# Patient Record
Sex: FEMALE | Race: WHITE | Employment: FULL TIME | URBAN - METROPOLITAN AREA
[De-identification: names, ages, dates, MRNs, and addresses within clinical notes are randomized per-mention and may not be internally consistent; named-entity substitution may affect disease eponyms.]

---

## 2018-10-04 ENCOUNTER — HOSPITAL ENCOUNTER (OUTPATIENT)
Dept: PHYSICAL THERAPY | Age: 43
End: 2018-10-04

## 2018-10-18 ENCOUNTER — HOSPITAL ENCOUNTER (OUTPATIENT)
Dept: PHYSICAL THERAPY | Age: 43
Discharge: HOME | End: 2018-10-18
Payer: COMMERCIAL

## 2018-10-18 PROCEDURE — 97161 PT EVAL LOW COMPLEX 20 MIN: CPT

## 2018-10-18 NOTE — PROGRESS NOTES
(GP):DASIA [address] [facility] PHYSICAL THERAPY 
[address] [facility] [address], [place], [address] - Phone: [phone]  Fax: [phone] PLAN OF CARE / STATEMENT OF MEDICAL NECESSITY FOR PHYSICAL THERAPY SERVICES Patient Name: Brittany Kim : 1975 Medical  
Diagnosis: Gait instability [R26.81] Vertigo [R42] Treatment Diagnosis: Vertigo, BPPV Onset Date: ~ 6 weeks ago Referral Source: Brittany Beltre MD [facility]): 10/18/2018 Prior Hospitalization: See medical history Provider #: [id number] Prior Level of Function: independent Comorbidities: Previous episode of vertigo in 2017 which resolved after Epley maneuver Medications: Verified on Patient Summary List  
The Plan of Care and following information is based on the information from the initial evaluation.  
=========================================================================================== Assessment / key information:  Patient is 37y.o. year old female who presents to [facility] in [place] with diagnosis of Gait instability [R26.81] Vertigo [R42]. Patient reports she rolled over in bed in 2017 and experienced vertigo. States she researched her symptoms online and performed the Epley maneuver which resolved the symptoms. States vertigo returned ~ 6 weeks ago when she rolled onto her right side. States she immediately performed the Epley maneuver. States the vertigo has resolved but states she \"does not feel right. \"  She describes a constant drunken feeling. She has also performed the sommersault technique for BPPV with no significant help.  Objective findings: 1) static standing balance WNL with exception of stance on rail with eyes closed (2\" on first attempt), 2) Sensory Organization Test (SOT) composite score of 78 (WNL), 3) Head Shake SOT shows a decline of 40% in balance control with vertical head movements (normal < 30%) 4) score of 46/100 on the Dizziness Handicap Inventory [facility]), placing her in the moderate handicap group 5) score of 29/30 on the Functional Gait Assessment (WNL). Patient with a Functional Status score of 93/100 on FOTO (Focused on Therapeutic Outcomes), which corresponds to a functional limitation of 7%. Patient can benefit from PT interventions to decrease fall risk and  improve safety with ADLs. . 
=========================================================================================== Eval Complexity: History: LOW Complexity : Zero comorbidities / personal factors that will impact the outcome / POCExam:MEDIUM Complexity : 3 Standardized tests and measures addressing body structure, function, activity limitation and / or participation in recreation  Presentation: MEDIUM Complexity : Evolving with changing characteristics  Clinical Decision Making:LOW Complexity : FOTO score of 75-100Overall Complexity:LOW Problem List: impaired gait/ balance, decrease ADL/ functional abilitiies, decrease activity tolerance, decrease flexibility/ joint mobility, decrease transfer abilities and other dizziness affecting function Treatment Plan may include any combination of the following: Therapeutic exercise, Therapeutic activities, Neuromuscular re-education, Physical agent/modality, Gait/balance training, Manual therapy and Patient education Patient / Family readiness to learn indicated by: asking questions, trying to perform skills and interest 
Persons(s) to be included in education: patient (P) Barriers to Learning/Limitations: None Measures taken:   
Patient Goal (s): \"I hope the problem goes away. \"  
Patient self reported health status: excellent Rehabilitation Potential: good ? Short Term Goals: To be accomplished in  4  weeks: 
1) Patient will report a 40% decrease in symptoms to improve quality of life. 2) Patient will be compliant with home exercise program. 
? Long Term Goals: To be accomplished in  8  weeks: 1) Patient will score < 28/100 on the [address] [place] [address] Street to indicate increased tolerance with functional activities. 2) Patient will report an 80% improvement in symptoms to improve quality of life. 3) Increase  Head Shake SOT average balance control to 0.70 or 30% decline with vertical head movements. 4) Patient to be independent with HEP in preparation for D/C. 
5 Frequency / Duration:   Patient to be seen  1  times per week for 6-8  weeks: 
Patient / Caregiver education and instruction: self care G-Codes (GP): NIC Therapist Signature: Jeffry Marx, PT Date: 10/18/2018 Certification Period: NA Time: 3:01 PM  
=============================================================== I certify that the above Physical Therapy Services are being furnished while the patient is under my care. I agree with the treatment plan and certify that this therapy is necessary. Physician Signature:       Date:      Time:  Please sign and return to [facility] or you may fax the signed copy to [phone].   Thank you. 
 
 
===========

## 2018-10-18 NOTE — PROGRESS NOTES
PHYSICAL THERAPY - DAILY TREATMENT NOTE Patient Name: Elizabeth Medeiros        Date: 10/18/2018 : 1975   YES Patient  Verified Visit #:   1   of   8  Insurance: Payor: Claude Nims / Plan: Donavan Tacojose luis PPO / Product Type: PPO / In time: 1:30 Out time: 2:50 Total Treatment Time: 80 Medicare Time Tracking (below) Total Timed Codes (min):  NA 1:1 Treatment Time:  NA  
TREATMENT AREA =  Vertigo SUBJECTIVE Pain Level (on 0 to 10 scale):  0  / 10 Medication Changes/New allergies or changes in medical history, any new surgeries or procedures? NO    If yes, update Summary List  
Subjective Functional Status/Changes:  []  No changes reported States she turned over in bed in 2017 and she self diagnosed herself and did the Epley maneuver. States she had another episode of vertigo ~6 weeks ago when she rolled onto her right side. .  States she did the summersault maneuver which helped to decrease vertigo. However, she states she has not felt \"right\" since this incident. States she has continued to perform Epley and sommersualt maneuvers with no success. States she feels \"drunk\" all the time. States she feels that she lists to the right. She reports increased symptoms with looking down. She walks 1 hour/day. She denies any vertigo since intial Epley maneuver. OBJECTIVE Physical Therapy Evaluation - Vestibular Posture:  [x] WNL [] Forward head    [] Protracted shoulders    [] Retracted shoulders 
[] Kyphosis:  [] increased   [] decreased  
[] Lordosis:   [] increased   [] decreased Other: C/S ROM: [x] WFL    [] Limited    Describe: 
 
Strength: [x] WFL    [] Limited    Describe: 
 
Optional Tests: 
Sensation:  [] Intact [] Diminished    Describe: 
 
Proprioception: [] Intact [] Diminished    Describe: 
 
Coordination Testing: 
     Disdiadochokinesia [] WFL    [] Impaired    Describe: 
     Heel - Mantilla  [] [facility]    [] Impaired    Describe: FNF   [] [facility]    [] Impaired    Describe: Toe Tap   [] WFL    [] Impaired    Describe: 
 
Oculomotor Tests: (Fixation Not Blocked) Ocular ROM:   [x] [facility]    [] Limited    Describe: 
     Spontaneous Nystag. [x] Neg     [] Pos    [] Left    [] Right Gaze Holding Nystag. [x] Neg     [] Pos    [] Left    [] Right Smooth Pursuit  [x] Neg     [] Pos    [] Left    [] Right Saccades   [x] Neg     [] Pos    [] Left    [] Right VOR - Slow Head Mvmt [x] Neg     [] Pos    [] Left    [] Right VOR - Fast Head Mvmt [x] Neg     [] Pos    [] Left    [] Right Head Thrust  [x] Neg     [] Pos    [] Left    [] Right Static Visual Acuity [] Neg     [] Pos    [] Left    [] Right Dynamic Visual Acuity [] Neg     [] Pos    [] Left    [] Right Other Special Tests: 
     Vertebral Artery Testing [] Neg     [] Pos    [] Left    [] Right Hallpike-Marcus Maneuver [] Neg     [] Pos    [] Left    [] Right Roll Test   [] Neg     [] Pos    [] Left    [] Right Harrison Balance Scale [] Neg     [] Pos    Score: 
     Dynamic Gait Index [] Neg     [] Pos    Score: 
     Functional Gait Assess. [] Neg     [] Pos    Score: 
 
Balance Standard Testing (Eyes Open/Eyes Closed - EO/EC) Romberg   [x] [facility]    [] Pos    Describe:     
     Romberg on Foam X WFL    [] Pos    Describe:  
     Standing on Rail  [] WFL    [] Pos    Describe: 30\"/2\",30\" Sharpened Romberg [x] WFL    [] Pos    Describe:  
     Single Leg Stand  [x] [facility]    [] Pos    Describe: Motion Sensitivity Test: 
 
Computerized Dynamic Posturography:  
     [] Not Tested    [x] WFL    Score:78 (WNL) 
   min Patient Education:  YES  Reviewed HEP []  Progressed/Changed HEP based on:   Educated patient on BPPV, anatomy of inner ear; discussed POC Other Objective/Functional Measures: 
 
See eval 
  
Post Treatment Pain Level (on 0 to 10) scale:   0  / 10 ASSESSMENT Assessment/Changes in Function:  
Justification for Eval Code Complexity: 
Patient History (low 0, mod 1-2, high 3-4): low Examination (low 1-2, mod 3+, high 4+): mod (see above) Clinical Presentation (low stable or uncomplicated, mod evolving or changing, high unstable or unpredictable): mod Clinical Decision Making (low , mod 26-74, high 1-25): FOTO = 93/100 low 
  
[]  See Progress Note/Recertification Patient will continue to benefit from skilled PT services to modify and progress therapeutic interventions, address functional mobility deficits, analyze and cue movement patterns, analyze and modify body mechanics/ergonomics, assess and modify postural abnormalities, address imbalance/dizziness and instruct in home and community integration to attain remaining goals. Progress toward goals / Updated goals: 
Goals established. PLAN [x]  Upgrade activities as tolerated YES Continue plan of care  
[]  Discharge due to :   
[]  Other:   
 
Therapist: García Chen PT Date: 10/18/2018 Time: 1:39 PM  
 
No future appointments.

## 2018-11-20 ENCOUNTER — HOSPITAL ENCOUNTER (OUTPATIENT)
Dept: PHYSICAL THERAPY | Age: 43
Discharge: HOME | End: 2018-11-20
Payer: COMMERCIAL

## 2018-11-20 PROCEDURE — 97112 NEUROMUSCULAR REEDUCATION: CPT

## 2018-11-20 NOTE — PROGRESS NOTES
Judie Bonds [address] [facility] PHYSICAL THERAPY 
[address] [facility] [address], [place], [address] - Phone: [phone]  Fax: [phone] PROGRESS NOTE Patient Name: Lucio Gentile : 1975 Treatment/Medical Diagnosis: Gait instability [R26.81] Vertigo [R42] Referral Source: Willian Reece MD    
Date of Initial Visit: 2018 Attended Visits: 2 Missed Visits: 2 SUMMARY OF TREATMENT Patient has had an evaluation and 1 additional PT treatment. Patient education was provided and the she was instructed in a home exercise program of vestibular and balance exercises. CURRENT STATUS Patient did not return to PT after her initial evaluation due to her reports of feeling better. She states her symptoms have returned and she would now like to resume therapy. We have initiated vestibular and balance exercises. Progress note written due to [facility] law requiring monthly progress reports. Goal/Measure of Progress Goal Met? 1. Patient will report a 40% decrease in symptoms to improve quality of life. Status at last Eval: NA Current Status: NT n/a 2. Patient will be compliant with home exercise program.  
Status at last Eval: NA Current Status: HEP initiated yes New Goals to be achieved in __4-8__  weeks: 
1) Patient will score < 28/100 on the [address] to indicate increased tolerance with functional activities. 2) Patient will report an 80% improvement in symptoms to improve quality of life. 3) Increase  Head Shake SOT average balance control to 0.70 or 30% decline with vertical head movements. 4) Patient to be independent with HEP in preparation for D/C. G-Codes: NA 
RECOMMENDATIONS Will begin vestibular rehab 1x/week for 4-8 weeks. If you have any questions/comments please contact us directly at [phone]. Thank you for allowing us to assist in the care of your patient. Therapist Signature: Hetal Bolanos PT Date: 2018   Time: 9:58 AM  
 NOTE TO PHYSICIAN:  PLEASE COMPLETE THE ORDERS BELOW AND FAX TO [facility] Physical Therapy: [phone]. If you are unable to process this request in 24 hours please contact our office: [phone]. 
 
___ I have read the above report and request that my patient continue as recommended.  
___ I have read the above report and request that my patient continue therapy with the following changes/special instructions:_________________________________________________________  
___ I have read the above report and request that my patient be discharged from therapy.   
 
Physician Signature:       Date:      Time:

## 2018-11-20 NOTE — PROGRESS NOTES
PHYSICAL THERAPY - DAILY TREATMENT NOTE Patient Name: Tori Castellano        Date: 2018 : 1975   YES Patient  Verified Visit #:   2   of   8  Insurance: Payor: Sulema Saldana / Plan: Maria Alejandra Lockhart PPO / Product Type: PPO / In time: 8:42 Out time: 9:15 Total Treatment Time: 35 Medicare/BCBS [place] Time Tracking (below) Total Timed Codes (min):  NA 1:1 Treatment Time:  NA  
TREATMENT AREA =  Vertigo SUBJECTIVE Pain Level (on 0 to 10 scale):  0  / 10 Medication Changes/New allergies or changes in medical history, any new surgeries or procedures? NO    If yes, update Summary List  
Subjective Functional Status/Changes:  []  No changes reported \"I was feeling better but I had vertigo again about a week ago and I am feeling like I did when I last saw you. \"  Reports she did the somersault maneuver which was successful. Reports she has been doing exercises that she found on the internet. OBJECTIVE 33 min Neuromuscular Re-ed:   
Rationale:      improve coordination, improve balance and increase proprioception to improve the patients ability to perform ADL's, gait, and functional mobility with decreased symptoms and increased safety. min Patient Education:  YES  Reviewed HEP []  Progressed/Changed HEP based on:   Issued HEP per handout Other Objective/Functional Measures: 
 
Began VSE/VVI and balance exercise progression Post Treatment Pain Level (on 0 to 10) scale:   0  / 10 ASSESSMENT Assessment/Changes in Function: Tolerated exercises with reports of mild symptoms. []  See Progress Note/Recertification Patient will continue to benefit from skilled PT services to modify and progress therapeutic interventions, address functional mobility deficits, analyze and cue movement patterns, analyze and modify body mechanics/ergonomics, assess and modify postural abnormalities, address imbalance/dizziness and instruct in home and community integration to attain remaining goals. Progress toward goals / Updated goals: · Short Term Goals: To be accomplished in  4  weeks: 
1) Patient will report a 40% decrease in symptoms to improve quality of life. 2) Patient will be compliant with home exercise program. 
 
PLAN [x]  Upgrade activities as tolerated YES Continue plan of care  
[]  Discharge due to :   
[]  Other:   
 
Therapist: Anabelle Cooley PT Date: 11/20/2018 Time: 9:51 AM  
 
Future Appointments Date Time Provider Alejandro Blunt 11/29/2018 11:00 AM Jloynn Montalvo PT [facility] AT [facility]

## 2018-11-29 ENCOUNTER — HOSPITAL ENCOUNTER (OUTPATIENT)
Dept: PHYSICAL THERAPY | Age: 43
Discharge: HOME | End: 2018-11-29
Payer: COMMERCIAL

## 2018-11-29 PROCEDURE — 97112 NEUROMUSCULAR REEDUCATION: CPT

## 2018-11-29 NOTE — PROGRESS NOTES
PHYSICAL THERAPY - DAILY TREATMENT NOTE Patient Name: Daija Morales        Date: 2018 : 1975   YES Patient  Verified Visit #:   3   of   8  Insurance: Payor: Nicolle Ours / Plan: Maria Elena Dias PPO / Product Type: PPO / In time: 11:03 Out time: 11:40 Total Treatment Time: 37 Medicare/BCBS [place] Time Tracking (below) Total Timed Codes (min):  NA 1:1 Treatment Time:  Na  
TREATMENT AREA =  Vertigo SUBJECTIVE Pain Level (on 0 to 10 scale):  0  / 10 Medication Changes/New allergies or changes in medical history, any new surgeries or procedures? NO    If yes, update Summary List  
Subjective Functional Status/Changes:  []  No changes reported \"I feel about the same. When I lie down, I feel like I am going to start spinning but I don't. \" OBJECTIVE 37 min Neuromuscular Re-ed:   
Rationale:      improve coordination, improve balance and increase proprioception to improve the patients ability to perform ADL's, gait, and functional mobility with increased safety and decreased symptoms. min Patient Education:  YES  Reviewed HEP []  Progressed/Changed HEP based on:   Progressed VSE/VVI; added walking exercises per handout Other Objective/Functional Measures: 
 
EO SR with large head turns on AirEx: right 23\"/left 28\" EC SR: 30\" B 
EC on AirEx SR: left 30\"/right 15\" [place]-Hallpike (-) B Roll test (-) B Post Treatment Pain Level (on 0 to 10) scale:   0  / 10 ASSESSMENT Assessment/Changes in Function:  
 
Patient reports mild symptoms after VSE, VVI, and gait with head turns. Symptoms resolved in < 3 minutes. []  See Progress Note/Recertification Patient will continue to benefit from skilled PT services to modify and progress therapeutic interventions, address functional mobility deficits, analyze and address soft tissue restrictions, analyze and cue movement patterns, analyze and modify body mechanics/ergonomics, assess and modify postural abnormalities, address imbalance/dizziness and instruct in home and community integration to attain remaining goals. Progress toward goals / Updated goals: 
New Goals to be achieved in __4-8__  weeks: 
1) Patient will score < 28/100 on the [address] to indicate increased tolerance with functional activities. 2) Patient will report an 80% improvement in symptoms to improve quality of life. 3) Increase  Head Shake SOT average balance control to 0.70 or 30% decline with vertical head movements.   
4) Patient to be independent with HEP in preparation for D/C. Compliant with HEP 
 
 
PLAN [x]  Upgrade activities as tolerated YES Continue plan of care  
[]  Discharge due to :   
[]  Other:   
 
Therapist: Jasiel Stevens PT Date: 11/29/2018 Time: 12:24 PM  
 
Future Appointments Date Time Provider Alejandro Blunt 12/4/2018  8:00 AM Rudy Dasilva PT [facility]  
12/12/2018  8:30 AM Rudy Dasilva PT [facility]  
12/17/2018  8:00 AM Rudy Dasilva PT [facility]

## 2018-12-04 ENCOUNTER — APPOINTMENT (OUTPATIENT)
Dept: PHYSICAL THERAPY | Age: 43
End: 2018-12-04
Payer: COMMERCIAL

## 2018-12-12 ENCOUNTER — APPOINTMENT (OUTPATIENT)
Dept: PHYSICAL THERAPY | Age: 43
End: 2018-12-12
Payer: COMMERCIAL

## 2018-12-13 ENCOUNTER — HOSPITAL ENCOUNTER (OUTPATIENT)
Dept: PHYSICAL THERAPY | Age: 43
Discharge: HOME | End: 2018-12-13
Payer: COMMERCIAL

## 2018-12-13 PROCEDURE — 97112 NEUROMUSCULAR REEDUCATION: CPT

## 2018-12-13 NOTE — PROGRESS NOTES
Judie Bonds [address]  [facility] PHYSICAL THERAPY  [address] [facility] [place], [address] - Phone: [phone]  Fax: [phone]  PROGRESS NOTE  Patient Name: Ruba Peterson : 1975   Treatment/Medical Diagnosis: Gait instability [R26.81]  Vertigo [R42]   Referral Source: Vince Shelby MD     Date of Initial Visit: 10/18/2018 Attended Visits: 4 Missed Visits: 0     SUMMARY OF TREATMENT  Treatment consisted of vestibular stimulation exercises, static and dynamic standing balance training, patient education, and home exercise program.  CURRENT STATUS  Patient is happy with her progress with physical therapy. She reports a 90% improvement in symptoms since beginning therapy. Her Dizziness Handicap Inventory score decreased from 46/100 (moderate handicap) to 18/100 (mild handicap), indicating improved ADL tolerance. She is currently performing high level standing balance exercises and demonstrates ability to perform these exercises independently. Goal/Measure of Progress Goal Met? 1. Patient will score < 28/100 on the [address] [place] [address] to indicate increased tolerance with functional activities. Status at last Eval: 46/100 Current Status: 18/100 yes   2. Patient will report an 80% improvement in symptoms to improve quality of life. Status at last Eval: NA Current Status: Reports a 90% improvement yes   3. Increase  Head Shake SOT average balance control to 0.70 or 30% decline with vertical head movements. Status at last Eval: 40% decline in balance with vertical head movements Current Status: NT due to time constraints n/a   4. Patient to be independent with HEP in preparation for D/C. Status at last Eval: Compliant with HEP Current Status: I with HEP yes     New Goals to be achieved in __4__  weeks:  1. Patient will be independent with HEP. G-Codes: NA  RECOMMENDATIONS  Patient to continue exercises independently for 3-4 weeks and call to report progress.   Will determine at that time if further PT is indicated. If you have any questions/comments please contact us directly at [phone]. Thank you for allowing us to assist in the care of your patient. Therapist Signature: Herminia Lara PT Date: 12/13/2018     Time: 8:07 AM   NOTE TO PHYSICIAN:  PLEASE COMPLETE THE ORDERS BELOW AND FAX TO   [facility] Physical Therapy: [phone]. If you are unable to process this request in 24 hours please contact our office: [phone].    ___ I have read the above report and request that my patient continue as recommended.   ___ I have read the above report and request that my patient continue therapy with the following changes/special instructions:_________________________________________________________   ___ I have read the above report and request that my patient be discharged from therapy.      Physician Signature:        Date:       Time:

## 2018-12-13 NOTE — PROGRESS NOTES
PHYSICAL THERAPY - DAILY TREATMENT NOTE    Patient Name: Tiffanie Mobley        Date: 2018  : 1975   YES Patient  Verified  Visit #:   4   of   8  Insurance: Payor: Russell Apodaca / Plan: Chen Major PPO / Product Type: PPO /      In time: 7:34 Out time: 8:01   Total Treatment Time: 27     Medicare/BCBS [place] Time Tracking (below)   Total Timed Codes (min):  NA 1:1 Treatment Time:  Na     TREATMENT AREA =  Vertigo    SUBJECTIVE    Pain Level (on 0 to 10 scale):  0  / 10   Medication Changes/New allergies or changes in medical history, any new surgeries or procedures? NO    If yes, update Summary List   Subjective Functional Status/Changes:  []  No changes reported     \"I am feeling so much better. Some days I don't even think about it anymore. \"  Reports a 90% improvement in symptoms. OBJECTIVE    27 min Neuromuscular Re-ed:    Rationale:      improve coordination, improve balance, increase proprioception and decrease symptoms to improve the patients ability to perform ADL's, gait, and functional mobility with decreased symptoms. min Patient Education:  YES  Reviewed HEP   []  Progressed/Changed HEP based on:   progressed VSE/VVI and advised patient on future progression of exercise     Other Objective/Functional Measures:    EO SR on AirEx with large head turns: left 27\"/right 25\"  EC SR on floor: 30\" B  EC SR on AirEx: left 4\"/right 3\"    DHI = 18/100     Post Treatment Pain Level (on 0 to 10) scale:   0  / 10     ASSESSMENT    Assessment/Changes in Function:     Patient has progressed well with physical therapy as evidenced by subjective reports and DHI score.      []  See Progress Note/Recertification   Patient will continue to benefit from skilled PT services to modify and progress therapeutic interventions, address functional mobility deficits, analyze and cue movement patterns, analyze and modify body mechanics/ergonomics, assess and modify postural abnormalities, address imbalance/dizziness and instruct in home and community integration to attain remaining goals. Progress toward goals / Updated goals:    New Goals to be achieved in __4-8__  weeks:  1) Patient will score < 28/100 on the [facility] to indicate increased tolerance with functional activities. [address] [place] [address] Street = 18/100  2) Patient will report an 80% improvement in symptoms to improve quality of life. Reports a 90% improvement in symptoms. 3) Increase  Head Shake SOT average balance control to 0.70 or 30% decline with vertical head movements.    4) Patient to be independent with HEP in preparation for D/C. reports independence with HEP     PLAN    []  Upgrade activities as tolerated YES Continue plan of care   []  Discharge due to :    [x]  Other: Will hold PT for 3-4 weeks; Patient to call to report progress/determine if further therapy is indicated. Therapist: Bari Ramirez, PT    Date: 12/13/2018 Time: 8:00 AM     No future appointments.

## 2018-12-17 ENCOUNTER — APPOINTMENT (OUTPATIENT)
Dept: PHYSICAL THERAPY | Age: 43
End: 2018-12-17
Payer: COMMERCIAL

## 2019-01-31 NOTE — PROGRESS NOTES
Dear :  Deepti Cunningham MD, under your direction, we have been providing physical therapy for your patient Lisa Peguero, for a diagnosis of Gait instability [R26.81] Vertigo [R42]. The patient was scheduled for 8 visits. They attended 4 of them and was last seen on 12/13/2018. Please refer to progress note dated 12/13/2018 for objective information. Patient phoned on 1/30/2019 stating she has continued her home exercise program and is feeling better. States she no longer needs physical therapy. Per her request, we are discharging the patient from physical therapy at this time. We appreciate the kind referral and would willingly work with this patient again, if she needs further outpatient physical therapy. Your patient's health is our primary concern. Should you have any further questions or concerns, please feel free to contact me at your convenience. Sincerely, Kristin Nina, PT                   1/31/2019 NOTE TO PHYSICIAN:  PLEASE COMPLETE THE ORDERS BELOW AND FAX TO [facility]: [phone]. If you are unable to process this request in 24 hours please contact our office: [phone]. 
 
___ I have read the above report and request that my patient be discharged from therapy.   
 
Physician Signature:       Date:      Time:

## 2022-01-27 ENCOUNTER — HOSPITAL ENCOUNTER (OUTPATIENT)
Dept: OCCUPATIONAL MEDICINE | Age: 47
Discharge: HOME | End: 2022-01-27
Attending: FAMILY MEDICINE

## 2022-01-27 ENCOUNTER — OFFICE VISIT (OUTPATIENT)
Dept: ORTHOPEDIC SURGERY | Age: 47
End: 2022-01-27
Payer: COMMERCIAL

## 2022-01-27 VITALS
HEART RATE: 65 BPM | WEIGHT: 118.6 LBS | OXYGEN SATURATION: 98 % | BODY MASS INDEX: 20.25 KG/M2 | RESPIRATION RATE: 16 BRPM | HEIGHT: 64 IN

## 2022-01-27 DIAGNOSIS — M76.32 IT BAND SYNDROME, LEFT: ICD-10-CM

## 2022-01-27 DIAGNOSIS — M99.03 LUMBAR REGION SOMATIC DYSFUNCTION: ICD-10-CM

## 2022-01-27 DIAGNOSIS — M75.21 TENDINITIS OF LONG HEAD OF BICEPS BRACHII OF RIGHT SHOULDER: ICD-10-CM

## 2022-01-27 DIAGNOSIS — M76.32 IT BAND SYNDROME, LEFT: Primary | ICD-10-CM

## 2022-01-27 DIAGNOSIS — M99.05 PELVIC SOMATIC DYSFUNCTION: ICD-10-CM

## 2022-01-27 DIAGNOSIS — M99.04 SACRAL REGION SOMATIC DYSFUNCTION: ICD-10-CM

## 2022-01-27 PROCEDURE — 99204 OFFICE O/P NEW MOD 45 MIN: CPT | Performed by: FAMILY MEDICINE

## 2022-01-27 PROCEDURE — 98926 OSTEOPATH MANJ 3-4 REGIONS: CPT | Performed by: FAMILY MEDICINE

## 2022-01-27 NOTE — PATIENT INSTRUCTIONS
Biceps Tendinitis: Exercises  Introduction  Here are some examples of exercises for you to try. The exercises may be suggested for a condition or for rehabilitation. Start each exercise slowly. Ease off the exercises if you start to have pain. You will be told when to start these exercises and which ones will work best for you. How to do the exercises  Biceps stretch    1. Stand and hold your affected arm out to the side, with your hand at about hip level. 2. Gently bend your wrist back so that your fingers point down toward the floor. 3. You may also do this next to a wall and rest your fingers on the wall. 4. For more of a stretch, bend your head to the opposite side of your affected arm. 5. Hold for 15 to 30 seconds. 6. Repeat 2 to 4 times. Resisted supination    For this and the following exercises, you will need elastic exercise material, such as surgical tubing or Thera-Band. 1. Sit leaning forward with your legs slightly spread. Then place your forearm on your thigh with your hand and affected wrist in front of your knee. 2. Grasp one end of an exercise band with your palm down, and step on the other end. 3. Keeping your wrist straight, roll your palm outward and away from your thigh for a count of 2, then slowly move your wrist back to the starting position to a count of 5.  4. Repeat 8 to 12 times. Resisted elbow flexion    1. Using your affected arm, hold one end of an elastic band in your hand. 2. Place the other end of the band under your foot on the same side of your body as your affected arm. 3. Slowly bend your elbow and bring your hand toward your shoulder. Your palm and the underside of your wrist should be facing up as you pull the band toward your shoulder. Count to 2 as you pull up. 4. Relax and slowly return to your starting position. Count to 5 as you return to the start. 5. Repeat 8 to 12 times. Resisted elbow flexion at shoulder level    1.  Tie the ends of an exercise band together to form a loop. Attach one end of the loop to a secure object or shut a door on it to hold it in place. (Or you can have someone hold one end of the loop to provide resistance.) The band should be at shoulder level. 2. Stand facing where you have tied or fastened the band. 3. Start with your affected arm held out straight, holding the band in your hand. 4. Slowly bend your elbow to 90 degrees, bringing your hand toward your forehead. Count to 2 as you pull the band toward your head. 5. Relax and slowly return to your starting position. Count to 5 as you return to the start. 6. Repeat 8 to 12 times. Follow-up care is a key part of your treatment and safety. Be sure to make and go to all appointments, and call your doctor if you are having problems. It's also a good idea to know your test results and keep a list of the medicines you take. Where can you learn more? Go to http://www.gray.com/  Enter D095 in the search box to learn more about \"Biceps Tendinitis: Exercises. \"  Current as of: July 1, 2021               Content Version: 13.0  © 4635-7013 [employer]. Care instructions adapted under license by [employer] (which disclaims liability or warranty for this information). If you have questions about a medical condition or this instruction, always ask your healthcare professional. [facility] any warranty or liability for your use of this information.       Search [employer] for my channel:    Dr. Mark Reeves cuff    Hip Stretches

## 2022-01-27 NOTE — PROGRESS NOTES
HISTORY OF PRESENT ILLNESS    Yamini Thrasher 1975 is a 55y.o. year old female comes in today as new patient for: left hip pain, right shoulder pain    Patients symptoms have been present since starting to jog up to 3 miles increasing began 3 weeks ago. Pain level 2/10 lateral. It has worsened with jogging and will improve with rest.  Patient has tried:  Stretch w/ mild benefit and ibuprofen. It is described as pain, tightness lateral hip w/o injury. Right shoulder pain for 1 week after yoga. Worse certain positions. IMAGING: XR left hip pending    History reviewed. No pertinent surgical history. Social History     Socioeconomic History    Marital status:    Tobacco Use    Smoking status: Never Smoker    Smokeless tobacco: Never Used   Vaping Use    Vaping Use: Never used   Substance and Sexual Activity    Alcohol use: Yes     Alcohol/week: 8.0 standard drinks     Types: 8 Glasses of wine per week    Drug use: Never        History reviewed. No pertinent past medical history. No family history on file. ROS:  No numb, tingle, incont, fever    Objective:  Pulse 65   Resp 16   Ht 5' 4\" (1.626 m)   Wt 118 lb 9.6 oz (53.8 kg)   SpO2 98%   BMI 20.36 kg/m²   NEURO:  Sensation intact to light touch. Reflexes +2/4 patellar and Achilles bilaterally. M/S: Examined standing and supine. Slump negative. Standing flexion test positive left  Sphinx positive right. ASIS low left  Iliac crests equal bilaterally Pubes equal bilaterally Medial malleolus low left  Sacral base posterior right  UVALDO low right  TTA at L4 on left worse flexion LE Strength +5/5 bilaterally Scour negative bilateral  LIZZETH negative bilateral .  Internal rotation normal. bilaterally  negative TTP over greater trochanter. Piriformis normal bilateral   Aki's test negative left.  TTP proximal IT band Ayush test negative for hip flexor and quad bilateral   TTP long biceps right      Assessment/Plan:     ICD-10-CM ICD-9-CM 1. It band syndrome, left  M76.32 728.89 XR HIP LT W OR WO PELV 2-3 VWS   2. Tendinitis of long head of biceps brachii of right shoulder  M75.21 726.12    3. Lumbar region somatic dysfunction  M99.03 739.3 MO OSTEOPATHIC MANIP,3-4 BODY REGN   4. Pelvic somatic dysfunction  M99.05 739.5 MO OSTEOPATHIC MANIP,3-4 BODY REGN   5. Sacral region somatic dysfunction  M99.04 739.4 MO OSTEOPATHIC MANIP,3-4 BODY REGN     Patient (or guardian if minor) verbalizes understanding of evaluation and plan. Verbal consent obtained. Lumbar, Pelvic and Sacral SD treated with ME. Correction of previous malalignments verified after Tx. Pt tolerated well. Notes improvement of Sx and pain is now rated 1/10. HEP/stretches daily. Discussed stretching/strengthening/posture. Total time spent on encounter including chart/imaging/lab review and evaluation/documentation/demo home program/coordination of care/form completion but not including time for any procedures/manipulation 47 minutes. Will start HEP as above w/ ibuprofen OTC PRN and plan follow-up 4 weeks.

## 2022-01-27 NOTE — LETTER
1/27/2022    Patient: Vita Huerta   YOB: 1975   Date of Visit: 1/27/2022     Summer Mitchell MD  [facility]  Via Fax: [phone]    Dear Summer Mitchell MD,      [facility] you [facility]. My notes for this consultation are attached. If you have questions, please do not hesitate to call me. I look forward to following your patient along with you.       Sincerely,    Tamir Andersen, DO

## 2022-02-22 ENCOUNTER — OFFICE VISIT (OUTPATIENT)
Dept: ORTHOPEDIC SURGERY | Age: 47
End: 2022-02-22
Payer: COMMERCIAL

## 2022-02-22 VITALS
HEIGHT: 64 IN | OXYGEN SATURATION: 99 % | BODY MASS INDEX: 20.18 KG/M2 | WEIGHT: 118.2 LBS | RESPIRATION RATE: 16 BRPM | HEART RATE: 74 BPM

## 2022-02-22 DIAGNOSIS — M99.04 SACRAL REGION SOMATIC DYSFUNCTION: ICD-10-CM

## 2022-02-22 DIAGNOSIS — M76.32 IT BAND SYNDROME, LEFT: Primary | ICD-10-CM

## 2022-02-22 DIAGNOSIS — M99.05 PELVIC SOMATIC DYSFUNCTION: ICD-10-CM

## 2022-02-22 DIAGNOSIS — M16.12 OSTEOARTHRITIS OF ONE HIP, LEFT: ICD-10-CM

## 2022-02-22 DIAGNOSIS — G57.02 PIRIFORMIS SYNDROME, LEFT: ICD-10-CM

## 2022-02-22 DIAGNOSIS — M75.21 TENDINITIS OF LONG HEAD OF BICEPS BRACHII OF RIGHT SHOULDER: ICD-10-CM

## 2022-02-22 DIAGNOSIS — M99.03 LUMBAR REGION SOMATIC DYSFUNCTION: ICD-10-CM

## 2022-02-22 PROCEDURE — 99213 OFFICE O/P EST LOW 20 MIN: CPT | Performed by: FAMILY MEDICINE

## 2022-02-22 PROCEDURE — 98926 OSTEOPATH MANJ 3-4 REGIONS: CPT | Performed by: FAMILY MEDICINE

## 2022-02-22 NOTE — LETTER
2/22/2022    Patient: Neal Graff   YOB: 1975   Date of Visit: 2/22/2022     Kings Solorio MD  [address] [address]  Via Fax: [phone]    Dear Kings Solorio MD,      Thank you for [facility] [address]. My notes for this consultation are attached. If you have questions, please do not hesitate to call me. I look forward to following your patient along with you.       Sincerely,    Mell Fails, DO

## 2022-02-22 NOTE — PROGRESS NOTES
HISTORY OF PRESENT ILLNESS    Sanchez Middleton 1975 is a 55y.o. year old female comes in today to be evaluated and treated for: left hip pain, right shoulder    Since last appt has noticed improvement with stretch but only good stretch from laying on side w/ leg off table. Pain level 3/10. Using no meds. Shoulder about the same but not much HEP. IMAGING: XR left hip 1/27/2022  IMPRESSION  1. Moderate left hip osteoarthritis. History reviewed. No pertinent surgical history. Social History     Socioeconomic History    Marital status:    Tobacco Use    Smoking status: Never Smoker    Smokeless tobacco: Never Used   Vaping Use    Vaping Use: Never used   Substance and Sexual Activity    Alcohol use: Yes     Alcohol/week: 8.0 standard drinks     Types: 8 Glasses of wine per week    Drug use: Never       History reviewed. No pertinent past medical history. No family history on file. ROS:  No numb, tingle, incont, fever    Objective:  Pulse 74   Resp 16   Ht 5' 4\" (1.626 m)   Wt 118 lb 3.2 oz (53.6 kg)   SpO2 99%   BMI 20.29 kg/m²   NEURO:  Sensation intact to light touch. Reflexes +2/4 patellar and Achilles bilaterally. M/S: Examined standing and supine. Slump negative. Standing flexion test positive left  Sphinx positive right. ASIS low left  Iliac crests equal bilaterally Pubes equal bilaterally Medial malleolus low left  Sacral base posterior right  UVALDO low right  TTA at L4 on left worse flexion LE Strength +5/5 bilaterally Scour negative bilateral  LIZZETH negative bilateral .  Internal rotation normal. bilaterally  negative TTP over greater trochanter. Piriformis normal bilateral   Aki's test negative left. TTP proximal IT band Ayush test negative for hip flexor and quad bilateral   Mild TTP long biceps right    Assessment/Plan:     ICD-10-CM ICD-9-CM    1. It band syndrome, left  M76.32 728.89    2. Piriformis syndrome, left  G57.02 355.0    3.  Osteoarthritis of one hip, left M16.12 715.95    4. Tendinitis of long head of biceps brachii of right shoulder  M75.21 726.12    5. Lumbar region somatic dysfunction  M99.03 739.3 LA OSTEOPATHIC MANIP,3-4 BODY REGN   6. Pelvic somatic dysfunction  M99.05 739.5 LA OSTEOPATHIC MANIP,3-4 BODY REGN   7. Sacral region somatic dysfunction  M99.04 739.4 LA OSTEOPATHIC MANIP,3-4 BODY REGN       Patient (or guardian if minor) verbalizes understanding of evaluation and plan. Verbal consent obtained. Lumbar, Pelvic and Sacral SD treated with ME. Correction of previous malalignments verified after Tx. Pt tolerated well. Notes improvement of Sx and pain is now rated 0/10. HEP/stretches daily. Discussed stretching/strengthening/posture. Will continue HEP as above and start stretch piriformis plan follow-up as needed. Discussed hip OA and progression.